# Patient Record
Sex: FEMALE | URBAN - METROPOLITAN AREA
[De-identification: names, ages, dates, MRNs, and addresses within clinical notes are randomized per-mention and may not be internally consistent; named-entity substitution may affect disease eponyms.]

---

## 2024-10-08 ENCOUNTER — LAB OUTSIDE AN ENCOUNTER (OUTPATIENT)
Dept: URBAN - METROPOLITAN AREA CLINIC 113 | Facility: CLINIC | Age: 64
End: 2024-10-08

## 2024-10-08 ENCOUNTER — DASHBOARD ENCOUNTERS (OUTPATIENT)
Age: 64
End: 2024-10-08

## 2024-10-08 ENCOUNTER — OFFICE VISIT (OUTPATIENT)
Dept: URBAN - METROPOLITAN AREA CLINIC 113 | Facility: CLINIC | Age: 64
End: 2024-10-08
Payer: COMMERCIAL

## 2024-10-08 VITALS
RESPIRATION RATE: 16 BRPM | SYSTOLIC BLOOD PRESSURE: 154 MMHG | BODY MASS INDEX: 29.83 KG/M2 | TEMPERATURE: 98.1 F | WEIGHT: 158 LBS | HEIGHT: 61 IN | HEART RATE: 68 BPM | DIASTOLIC BLOOD PRESSURE: 84 MMHG

## 2024-10-08 DIAGNOSIS — K21.9 GERD WITHOUT ESOPHAGITIS: ICD-10-CM

## 2024-10-08 DIAGNOSIS — R11.2 NAUSEA AND VOMITING, UNSPECIFIED VOMITING TYPE: ICD-10-CM

## 2024-10-08 DIAGNOSIS — R14.0 ABDOMINAL BLOATING: ICD-10-CM

## 2024-10-08 DIAGNOSIS — R10.9 ABDOMINAL PRESSURE: ICD-10-CM

## 2024-10-08 DIAGNOSIS — K59.09 CHRONIC CONSTIPATION: ICD-10-CM

## 2024-10-08 PROBLEM — 236069009: Status: ACTIVE | Noted: 2024-10-08

## 2024-10-08 PROBLEM — 116289008: Status: ACTIVE | Noted: 2024-10-08

## 2024-10-08 PROBLEM — 247330004: Status: ACTIVE | Noted: 2024-10-08

## 2024-10-08 PROBLEM — 266435005: Status: ACTIVE | Noted: 2024-10-08

## 2024-10-08 PROBLEM — 16932000: Status: ACTIVE | Noted: 2024-10-08

## 2024-10-08 PROCEDURE — 99204 OFFICE O/P NEW MOD 45 MIN: CPT | Performed by: NURSE PRACTITIONER

## 2024-10-08 RX ORDER — LISINOPRIL 30 MG/1
TABLET ORAL
Qty: 90 TABLET | Status: ACTIVE | COMMUNITY

## 2024-10-08 RX ORDER — SIMVASTATIN 40 MG/1
TABLET, FILM COATED ORAL
Qty: 90 TABLET | Status: ACTIVE | COMMUNITY

## 2024-10-08 RX ORDER — POTASSIUM CHLORIDE 1500 MG/1
TABLET, EXTENDED RELEASE ORAL
Qty: 30 TABLET | Status: ACTIVE | COMMUNITY

## 2024-10-08 RX ORDER — FAMOTIDINE 40 MG/1
1 TABLET TABLET, FILM COATED ORAL TWICE A DAY
Qty: 180 TABLET | Refills: 3 | OUTPATIENT
Start: 2024-10-08

## 2024-10-08 RX ORDER — AMLODIPINE BESYLATE 10 MG/1
TABLET ORAL
Qty: 90 TABLET | Status: ACTIVE | COMMUNITY

## 2024-10-08 RX ORDER — FAMOTIDINE 20 MG/1
TAKE 1 TABLET TABLET, FILM COATED ORAL ONCE A DAY
Status: ACTIVE | COMMUNITY

## 2024-10-08 RX ORDER — DULAGLUTIDE 1.5 MG/.5ML
INJECTION, SOLUTION SUBCUTANEOUS
Qty: 2 MILLILITER | Status: ACTIVE | COMMUNITY

## 2024-10-08 RX ORDER — PANTOPRAZOLE 40 MG/1
TABLET, DELAYED RELEASE ORAL
Qty: 90 TABLET | Status: ON HOLD | COMMUNITY

## 2024-10-08 RX ORDER — INSULIN GLARGINE 100 [IU]/ML
INJECTION, SOLUTION SUBCUTANEOUS
Qty: 18 MILLILITER | Status: ACTIVE | COMMUNITY

## 2024-10-08 RX ORDER — FUROSEMIDE 20 MG/1
1 TABLET TABLET ORAL ONCE A DAY
Qty: 30 TABLET | Status: ACTIVE | COMMUNITY

## 2024-10-08 RX ORDER — METFORMIN HYDROCHLORIDE 500 MG/1
TABLET, FILM COATED ORAL
Qty: 180 TABLET | Status: ACTIVE | COMMUNITY

## 2024-10-08 RX ORDER — GABAPENTIN 100 MG/1
CAPSULE ORAL
Qty: 90 CAPSULE | Status: ACTIVE | COMMUNITY

## 2024-10-08 RX ORDER — DAPAGLIFLOZIN 10 MG/1
TABLET, FILM COATED ORAL
Qty: 90 TABLET | Status: ACTIVE | COMMUNITY

## 2024-10-08 RX ORDER — GLIPIZIDE 5 MG/1
TABLET ORAL
Qty: 180 TABLET | Status: ACTIVE | COMMUNITY

## 2024-10-08 RX ORDER — PROMETHAZINE HYDROCHLORIDE 12.5 MG/1
TABLET ORAL
Qty: 20 TABLET | Status: ACTIVE | COMMUNITY

## 2024-10-08 NOTE — HPI-TODAY'S VISIT:
This is a 63-year-old female with a history of hypertension, diabetes, hyperlipidemia, chronic kidney disease referred from her primary care provider at [facility] for GERD, EGD, and colonoscopy. She reports a 1 year history of chronic abdominal bloating that is worse after meals.  She feels as though there is a "cinderblock" in her abdomen causing pressure.  She reports her abdomen is hard.  She denies actual pain.  Bloating is worse after eating Posta, rice, and bread.  She has a history of frequent nausea and sporadic vomiting and excessive belching that has been present since last year.  She has frequent nausea and daily episodes of vomiting that will last 1 or 2 weeks occurring more than once a month.  She is vomiting food.  She denies hematemesis.  Ondansetron has been ineffective.  She is taking promethazine daily to help with symptoms.  She has a history of acid reflux.  She was taking pantoprazole 40 mg daily and was having daily symptoms.  Pantoprazole was discontinued and she was prescribed famotidine 20 mg daily.  She has been on this medication for 3 weeks and reports breakthrough symptoms twice a week.  She denies dysphagia.  She reports that she cannot eat but has not lost weight.  She has history of chronic constipation reporting a hard stool every 2 to 3 days.  She started stool softeners last week and reports this has been helpful. She reports labs within the last 1 or 2 months and a CT scan at [facility] within the last 1 or 2 months.  She reports her A1c was 6.  She states her glucose levels have been high.  She has been on Trulicity for a year.  She takes low-dose aspirin for history of CVA.  She denies other NSAID use.  She denies a family history of esophageal, gastric, or colon cancer. She is using lactose free milk.  She denies use of other dairy products.  She is not drinking carbonated beverages. She reports a colonoscopy 4 years ago during which polyps were removed.  This was performed at [facility].  She states she is due for her next colonoscopy in 2025.

## 2024-11-05 ENCOUNTER — CLAIMS CREATED FROM THE CLAIM WINDOW (OUTPATIENT)
Dept: URBAN - METROPOLITAN AREA SURGERY CENTER 25 | Facility: SURGERY CENTER | Age: 64
End: 2024-11-05
Payer: COMMERCIAL

## 2024-11-05 ENCOUNTER — CLAIMS CREATED FROM THE CLAIM WINDOW (OUTPATIENT)
Dept: URBAN - METROPOLITAN AREA CLINIC 4 | Facility: CLINIC | Age: 64
End: 2024-11-05
Payer: COMMERCIAL

## 2024-11-05 DIAGNOSIS — K29.60 ADENOPAPILLOMATOSIS GASTRICA: ICD-10-CM

## 2024-11-05 DIAGNOSIS — K29.60 OTHER GASTRITIS WITHOUT BLEEDING: ICD-10-CM

## 2024-11-05 DIAGNOSIS — R11.0 AM NAUSEA: ICD-10-CM

## 2024-11-05 DIAGNOSIS — R12 BURNING REFLUX: ICD-10-CM

## 2024-11-05 DIAGNOSIS — K29.70 GASTRITIS, UNSPECIFIED, WITHOUT BLEEDING: ICD-10-CM

## 2024-11-05 DIAGNOSIS — K44.9 HIATAL HERNIA: ICD-10-CM

## 2024-11-05 DIAGNOSIS — R68.81 EARLY SATIETY: ICD-10-CM

## 2024-11-05 DIAGNOSIS — R11.0 NAUSEA: ICD-10-CM

## 2024-11-05 PROCEDURE — 88342 IMHCHEM/IMCYTCHM 1ST ANTB: CPT | Performed by: PATHOLOGY

## 2024-11-05 PROCEDURE — 88305 TISSUE EXAM BY PATHOLOGIST: CPT | Performed by: PATHOLOGY

## 2024-11-05 PROCEDURE — 43239 EGD BIOPSY SINGLE/MULTIPLE: CPT | Performed by: STUDENT IN AN ORGANIZED HEALTH CARE EDUCATION/TRAINING PROGRAM

## 2024-11-05 PROCEDURE — 00731 ANES UPR GI NDSC PX NOS: CPT | Performed by: NURSE ANESTHETIST, CERTIFIED REGISTERED

## 2024-11-05 RX ORDER — GABAPENTIN 100 MG/1
CAPSULE ORAL
Qty: 90 CAPSULE | Status: ACTIVE | COMMUNITY

## 2024-11-05 RX ORDER — PROMETHAZINE HYDROCHLORIDE 12.5 MG/1
TABLET ORAL
Qty: 20 TABLET | Status: ACTIVE | COMMUNITY

## 2024-11-05 RX ORDER — INSULIN GLARGINE 100 [IU]/ML
INJECTION, SOLUTION SUBCUTANEOUS
Qty: 18 MILLILITER | Status: ACTIVE | COMMUNITY

## 2024-11-05 RX ORDER — POTASSIUM CHLORIDE 1500 MG/1
TABLET, EXTENDED RELEASE ORAL
Qty: 30 TABLET | Status: ACTIVE | COMMUNITY

## 2024-11-05 RX ORDER — FAMOTIDINE 20 MG/1
TAKE 1 TABLET TABLET, FILM COATED ORAL ONCE A DAY
Status: ACTIVE | COMMUNITY

## 2024-11-05 RX ORDER — LISINOPRIL 30 MG/1
TABLET ORAL
Qty: 90 TABLET | Status: ACTIVE | COMMUNITY

## 2024-11-05 RX ORDER — AMLODIPINE BESYLATE 10 MG/1
TABLET ORAL
Qty: 90 TABLET | Status: ACTIVE | COMMUNITY

## 2024-11-05 RX ORDER — GLIPIZIDE 5 MG/1
TABLET ORAL
Qty: 180 TABLET | Status: ACTIVE | COMMUNITY

## 2024-11-05 RX ORDER — PANTOPRAZOLE 40 MG/1
TABLET, DELAYED RELEASE ORAL
Qty: 90 TABLET | Status: ON HOLD | COMMUNITY

## 2024-11-05 RX ORDER — FAMOTIDINE 40 MG/1
1 TABLET TABLET, FILM COATED ORAL TWICE A DAY
Qty: 180 TABLET | Refills: 3 | Status: ACTIVE | COMMUNITY
Start: 2024-10-08

## 2024-11-05 RX ORDER — METFORMIN HYDROCHLORIDE 500 MG/1
TABLET, FILM COATED ORAL
Qty: 180 TABLET | Status: ACTIVE | COMMUNITY

## 2024-11-05 RX ORDER — DULAGLUTIDE 1.5 MG/.5ML
INJECTION, SOLUTION SUBCUTANEOUS
Qty: 2 MILLILITER | Status: ACTIVE | COMMUNITY

## 2024-11-05 RX ORDER — DAPAGLIFLOZIN 10 MG/1
TABLET, FILM COATED ORAL
Qty: 90 TABLET | Status: ACTIVE | COMMUNITY

## 2024-11-05 RX ORDER — SIMVASTATIN 40 MG/1
TABLET, FILM COATED ORAL
Qty: 90 TABLET | Status: ACTIVE | COMMUNITY

## 2024-11-05 RX ORDER — FUROSEMIDE 20 MG/1
1 TABLET TABLET ORAL ONCE A DAY
Qty: 30 TABLET | Status: ACTIVE | COMMUNITY

## 2024-11-20 ENCOUNTER — TELEPHONE ENCOUNTER (OUTPATIENT)
Dept: URBAN - METROPOLITAN AREA CLINIC 113 | Facility: CLINIC | Age: 64
End: 2024-11-20

## 2024-11-26 ENCOUNTER — OFFICE VISIT (OUTPATIENT)
Dept: URBAN - METROPOLITAN AREA CLINIC 113 | Facility: CLINIC | Age: 64
End: 2024-11-26
Payer: COMMERCIAL

## 2024-11-26 VITALS
TEMPERATURE: 98.1 F | HEART RATE: 67 BPM | SYSTOLIC BLOOD PRESSURE: 176 MMHG | RESPIRATION RATE: 20 BRPM | WEIGHT: 160 LBS | BODY MASS INDEX: 30.21 KG/M2 | DIASTOLIC BLOOD PRESSURE: 83 MMHG | HEIGHT: 61 IN

## 2024-11-26 DIAGNOSIS — Z86.0100 HX OF COLONIC POLYPS: ICD-10-CM

## 2024-11-26 DIAGNOSIS — R10.9 ABDOMINAL PRESSURE: ICD-10-CM

## 2024-11-26 DIAGNOSIS — R14.0 ABDOMINAL BLOATING: ICD-10-CM

## 2024-11-26 DIAGNOSIS — R10.13 EPIGASTRIC ABDOMINAL PAIN: ICD-10-CM

## 2024-11-26 DIAGNOSIS — K21.9 GERD WITHOUT ESOPHAGITIS: ICD-10-CM

## 2024-11-26 DIAGNOSIS — R11.2 NAUSEA AND VOMITING, UNSPECIFIED VOMITING TYPE: ICD-10-CM

## 2024-11-26 PROCEDURE — 99213 OFFICE O/P EST LOW 20 MIN: CPT | Performed by: NURSE PRACTITIONER

## 2024-11-26 RX ORDER — AMLODIPINE BESYLATE 10 MG/1
TABLET ORAL
Qty: 90 TABLET | Status: ACTIVE | COMMUNITY

## 2024-11-26 RX ORDER — PANTOPRAZOLE 40 MG/1
TABLET, DELAYED RELEASE ORAL
Qty: 90 TABLET | Status: ACTIVE | COMMUNITY

## 2024-11-26 RX ORDER — FUROSEMIDE 20 MG/1
1 TABLET TABLET ORAL ONCE A DAY
Qty: 30 TABLET | Status: ACTIVE | COMMUNITY

## 2024-11-26 RX ORDER — INSULIN GLARGINE 100 [IU]/ML
INJECTION, SOLUTION SUBCUTANEOUS
Qty: 18 MILLILITER | Status: ACTIVE | COMMUNITY

## 2024-11-26 RX ORDER — GABAPENTIN 100 MG/1
CAPSULE ORAL
Qty: 90 CAPSULE | Status: ACTIVE | COMMUNITY

## 2024-11-26 RX ORDER — GLIPIZIDE 5 MG/1
TABLET ORAL
Qty: 180 TABLET | Status: ACTIVE | COMMUNITY

## 2024-11-26 RX ORDER — SIMVASTATIN 40 MG/1
TABLET, FILM COATED ORAL
Qty: 90 TABLET | Status: ACTIVE | COMMUNITY

## 2024-11-26 RX ORDER — PROMETHAZINE HYDROCHLORIDE 12.5 MG/1
TABLET ORAL
Qty: 20 TABLET | Status: ACTIVE | COMMUNITY

## 2024-11-26 RX ORDER — DULAGLUTIDE 1.5 MG/.5ML
INJECTION, SOLUTION SUBCUTANEOUS
Qty: 2 MILLILITER | Status: ACTIVE | COMMUNITY

## 2024-11-26 RX ORDER — DAPAGLIFLOZIN 10 MG/1
TABLET, FILM COATED ORAL
Qty: 90 TABLET | Status: ACTIVE | COMMUNITY

## 2024-11-26 RX ORDER — FAMOTIDINE 20 MG/1
TAKE 1 TABLET TABLET, FILM COATED ORAL ONCE A DAY
Status: ON HOLD | COMMUNITY

## 2024-11-26 RX ORDER — FAMOTIDINE 40 MG/1
1 TABLET TABLET, FILM COATED ORAL TWICE A DAY
Qty: 180 TABLET | Refills: 3 | Status: ACTIVE | COMMUNITY
Start: 2024-10-08

## 2024-11-26 RX ORDER — LISINOPRIL 30 MG/1
TABLET ORAL
Qty: 90 TABLET | Status: ACTIVE | COMMUNITY

## 2024-11-26 RX ORDER — METFORMIN HYDROCHLORIDE 500 MG/1
1 TABLET WITH A MEAL TABLET, FILM COATED ORAL TWICE A DAY
Qty: 180 TABLET | Status: ACTIVE | COMMUNITY

## 2024-11-26 RX ORDER — POTASSIUM CHLORIDE 1500 MG/1
TABLET, EXTENDED RELEASE ORAL
Qty: 30 TABLET | Status: ACTIVE | COMMUNITY

## 2024-11-26 NOTE — HPI-TODAY'S VISIT:
This is a 63-year-old female with a history of hypertension, diabetes, hyperlipidemia, chronic kidney disease, GERD, abdominal bloating, nausea and vomiting, and chronic constipation presenting for follow-up. She was initially seen 10/8/2024 referred for GERD, EGD, and colonoscopy.  She reported frequent breakthrough acid reflux symptoms on pantoprazole.  This had improved somewhat on famotidine 20 mg daily.  She was to increase famotidine to 40 mg twice a day.  She had chronic nausea and vomiting.  It was discussed this may be a side effect of Trulicity or possible gastroparesis from diabetes.  She was scheduled for an EGD and a gastric emptying study was ordered.  In the interim, she was to begin small, low residue meals.  Was discussed that chronic constipation may be contributing to bloating.  She reported some improvement on daily stool softeners.  If symptoms persisted, she was to add MiraLAX 1 capful daily.  Small intestinal bacterial overgrowth associated with his diabetes was also a consideration.  Antibiotic therapy was to be considered if her symptoms would not improve after improvement of bowel habits.  She had a recent CT scan.  The report was requested.  She reported a history of colon polyps removed during colonoscopy 4 years prior.  She stated she was due surveillance in 2025.  The report was requested. Sucrose breath test 11/10/2024:Normal sucrase activity.  EGD 11/5/2024:Normal esophagus, regular Z-line, gastroesophageal flap valve classified as Hill grade 4, 4 cm hiatal hernia, nonerosive gastritis characterized by erythema and adherent blood status post biopsy, normal examined duodenum.  Pathology: Stomach biopsies demonstrated mild chronic gastritis, nonspecific without evidence of H. pylori or intestinal metaplasia.  Labs 8/27/2024:CBC: WBC 7.1, hemoglobin 12.4, MCV 91, platelet 251.  BMP normal with exception of glucose 279, BUN 35, creatinine 1.36.  LFTs: TB less than 0.2, , ALT 21, AST 19.  Cholesterol panel normal.  Hemoglobin A1c 8.6.  TSH 1.760.  Magnesium 2.2.  CT report and colonoscopy records have not been received.  She continues to experience epigastric pain.  She reports it is constant.  She feels as though there is a "cinderblock" in her abdomen after eating.  She has frequent nausea.  She is vomiting intermittently producing fluid, or, if she has eaten, food.  Nausea medication is providing some relief.  She is having normal bowel movements and denies any other abdominal symptoms.  She has lost no weight since her last visit.  She tried a drug holiday from [employer] for a month.  Her symptoms did not improve.  Trulicity and insulin were increased 3 weeks ago due to uncontrolled blood glucose levels.  She has labs planned with her primary care physician on 12/2/2024.  She has had blood work since her last visit.  She recalls a hemoglobin A1c of 7.  She reports a gastric emptying study at [facility] in the first week of November.  She is unsure regarding results.  She is eating 1 meal per day.  She continues to report episodes of acid reflux on pantoprazole 40 mg in the morning and famotidine 40 mg twice a day.  She is having regular bowel movements.

## 2024-11-28 ENCOUNTER — TELEPHONE ENCOUNTER (OUTPATIENT)
Dept: URBAN - METROPOLITAN AREA CLINIC 113 | Facility: CLINIC | Age: 64
End: 2024-11-28

## 2024-11-28 PROBLEM — 428283002: Status: ACTIVE | Noted: 2024-11-28

## 2024-11-28 PROBLEM — 79922009: Status: ACTIVE | Noted: 2024-11-28

## 2024-12-01 ENCOUNTER — LAB OUTSIDE AN ENCOUNTER (OUTPATIENT)
Dept: URBAN - METROPOLITAN AREA CLINIC 113 | Facility: CLINIC | Age: 64
End: 2024-12-01

## 2024-12-02 ENCOUNTER — TELEPHONE ENCOUNTER (OUTPATIENT)
Dept: URBAN - METROPOLITAN AREA CLINIC 113 | Facility: CLINIC | Age: 64
End: 2024-12-02

## 2024-12-02 RX ORDER — VONOPRAZAN FUMARATE 26.72 MG/1
1 TABLET TABLET ORAL ONCE A DAY
Qty: 90 TABLET | Refills: 1 | OUTPATIENT
Start: 2024-12-01 | End: 2025-05-31

## 2024-12-14 PROBLEM — 733657002: Status: ACTIVE | Noted: 2024-12-14

## 2025-01-15 ENCOUNTER — OFFICE VISIT (OUTPATIENT)
Dept: URBAN - METROPOLITAN AREA SURGERY CENTER 25 | Facility: SURGERY CENTER | Age: 65
End: 2025-01-15
Payer: COMMERCIAL

## 2025-01-15 DIAGNOSIS — R12 HEARTBURN: ICD-10-CM

## 2025-01-15 DIAGNOSIS — K44.9 HIATAL HERNIA: ICD-10-CM

## 2025-01-15 PROCEDURE — 00811 ANES LWR INTST NDSC NOS: CPT | Performed by: NURSE ANESTHETIST, CERTIFIED REGISTERED

## 2025-01-15 PROCEDURE — 43235 EGD DIAGNOSTIC BRUSH WASH: CPT | Performed by: STUDENT IN AN ORGANIZED HEALTH CARE EDUCATION/TRAINING PROGRAM

## 2025-01-15 RX ORDER — INSULIN GLARGINE 100 [IU]/ML
INJECTION, SOLUTION SUBCUTANEOUS
Qty: 18 MILLILITER | Status: ACTIVE | COMMUNITY

## 2025-01-15 RX ORDER — METFORMIN HYDROCHLORIDE 500 MG/1
1 TABLET WITH A MEAL TABLET, FILM COATED ORAL TWICE A DAY
Qty: 180 TABLET | Status: ACTIVE | COMMUNITY

## 2025-01-15 RX ORDER — VONOPRAZAN FUMARATE 26.72 MG/1
1 TABLET TABLET ORAL ONCE A DAY
Qty: 90 TABLET | Refills: 1 | Status: ACTIVE | COMMUNITY
Start: 2024-12-01 | End: 2025-05-31

## 2025-01-15 RX ORDER — AMLODIPINE BESYLATE 10 MG/1
TABLET ORAL
Qty: 90 TABLET | Status: ACTIVE | COMMUNITY

## 2025-01-15 RX ORDER — FUROSEMIDE 20 MG/1
1 TABLET TABLET ORAL ONCE A DAY
Qty: 30 TABLET | Status: ACTIVE | COMMUNITY

## 2025-01-15 RX ORDER — DULAGLUTIDE 1.5 MG/.5ML
INJECTION, SOLUTION SUBCUTANEOUS
Qty: 2 MILLILITER | Status: ACTIVE | COMMUNITY

## 2025-01-15 RX ORDER — FAMOTIDINE 40 MG/1
1 TABLET TABLET, FILM COATED ORAL TWICE A DAY
Qty: 180 TABLET | Refills: 3 | Status: ACTIVE | COMMUNITY
Start: 2024-10-08

## 2025-01-15 RX ORDER — SIMVASTATIN 40 MG/1
TABLET, FILM COATED ORAL
Qty: 90 TABLET | Status: ACTIVE | COMMUNITY

## 2025-01-15 RX ORDER — POTASSIUM CHLORIDE 1500 MG/1
TABLET, EXTENDED RELEASE ORAL
Qty: 30 TABLET | Status: ACTIVE | COMMUNITY

## 2025-01-15 RX ORDER — GLIPIZIDE 5 MG/1
TABLET ORAL
Qty: 180 TABLET | Status: ACTIVE | COMMUNITY

## 2025-01-15 RX ORDER — DAPAGLIFLOZIN 10 MG/1
TABLET, FILM COATED ORAL
Qty: 90 TABLET | Status: ACTIVE | COMMUNITY

## 2025-01-15 RX ORDER — FAMOTIDINE 20 MG/1
TAKE 1 TABLET TABLET, FILM COATED ORAL ONCE A DAY
Status: ON HOLD | COMMUNITY

## 2025-01-15 RX ORDER — LISINOPRIL 30 MG/1
TABLET ORAL
Qty: 90 TABLET | Status: ACTIVE | COMMUNITY

## 2025-01-15 RX ORDER — GABAPENTIN 100 MG/1
CAPSULE ORAL
Qty: 90 CAPSULE | Status: ACTIVE | COMMUNITY

## 2025-01-15 RX ORDER — PROMETHAZINE HYDROCHLORIDE 12.5 MG/1
TABLET ORAL
Qty: 20 TABLET | Status: ACTIVE | COMMUNITY

## 2025-01-15 RX ORDER — PANTOPRAZOLE 40 MG/1
TABLET, DELAYED RELEASE ORAL
Qty: 90 TABLET | Status: ACTIVE | COMMUNITY

## 2025-01-17 ENCOUNTER — OFFICE VISIT (OUTPATIENT)
Dept: URBAN - METROPOLITAN AREA CLINIC 112 | Facility: CLINIC | Age: 65
End: 2025-01-17

## 2025-01-27 ENCOUNTER — TELEPHONE ENCOUNTER (OUTPATIENT)
Dept: URBAN - METROPOLITAN AREA CLINIC 113 | Facility: CLINIC | Age: 65
End: 2025-01-27

## 2025-01-27 ENCOUNTER — OFFICE VISIT (OUTPATIENT)
Dept: URBAN - METROPOLITAN AREA CLINIC 113 | Facility: CLINIC | Age: 65
End: 2025-01-27
Payer: COMMERCIAL

## 2025-01-27 VITALS
BODY MASS INDEX: 29.07 KG/M2 | HEIGHT: 61 IN | WEIGHT: 154 LBS | TEMPERATURE: 97.7 F | RESPIRATION RATE: 20 BRPM | DIASTOLIC BLOOD PRESSURE: 93 MMHG | SYSTOLIC BLOOD PRESSURE: 184 MMHG | HEART RATE: 81 BPM

## 2025-01-27 DIAGNOSIS — K59.09 CHRONIC CONSTIPATION: ICD-10-CM

## 2025-01-27 DIAGNOSIS — K44.9 HIATAL HERNIA: ICD-10-CM

## 2025-01-27 DIAGNOSIS — K21.9 GERD WITHOUT ESOPHAGITIS: ICD-10-CM

## 2025-01-27 PROCEDURE — 99214 OFFICE O/P EST MOD 30 MIN: CPT | Performed by: STUDENT IN AN ORGANIZED HEALTH CARE EDUCATION/TRAINING PROGRAM

## 2025-01-27 RX ORDER — GLIPIZIDE 5 MG/1
TABLET ORAL
Qty: 180 TABLET | Status: ACTIVE | COMMUNITY

## 2025-01-27 RX ORDER — DAPAGLIFLOZIN 10 MG/1
TABLET, FILM COATED ORAL
Qty: 90 TABLET | Status: ACTIVE | COMMUNITY

## 2025-01-27 RX ORDER — PANTOPRAZOLE 40 MG/1
TABLET, DELAYED RELEASE ORAL
Qty: 90 TABLET | Status: ACTIVE | COMMUNITY

## 2025-01-27 RX ORDER — FAMOTIDINE 20 MG/1
TAKE 1 TABLET TABLET, FILM COATED ORAL ONCE A DAY
Status: ON HOLD | COMMUNITY

## 2025-01-27 RX ORDER — INSULIN GLARGINE 100 [IU]/ML
INJECTION, SOLUTION SUBCUTANEOUS
Qty: 18 MILLILITER | Status: ACTIVE | COMMUNITY

## 2025-01-27 RX ORDER — METFORMIN HYDROCHLORIDE 500 MG/1
1 TABLET WITH A MEAL TABLET, FILM COATED ORAL TWICE A DAY
Qty: 180 TABLET | Status: ACTIVE | COMMUNITY

## 2025-01-27 RX ORDER — LINACLOTIDE 290 UG/1
1 CAPSULE AT LEAST 30 MINUTES BEFORE THE FIRST MEAL OF THE DAY ON AN EMPTY STOMACH CAPSULE, GELATIN COATED ORAL ONCE A DAY
Qty: 90 | Refills: 1 | OUTPATIENT
Start: 2025-01-27 | End: 2025-07-26

## 2025-01-27 RX ORDER — GABAPENTIN 100 MG/1
CAPSULE ORAL
Qty: 90 CAPSULE | Status: ACTIVE | COMMUNITY

## 2025-01-27 RX ORDER — LISINOPRIL 30 MG/1
TABLET ORAL
Qty: 90 TABLET | Status: ACTIVE | COMMUNITY

## 2025-01-27 RX ORDER — FUROSEMIDE 20 MG/1
1 TABLET TABLET ORAL ONCE A DAY
Qty: 30 TABLET | Status: ACTIVE | COMMUNITY

## 2025-01-27 RX ORDER — SIMVASTATIN 40 MG/1
TABLET, FILM COATED ORAL
Qty: 90 TABLET | Status: ACTIVE | COMMUNITY

## 2025-01-27 RX ORDER — POTASSIUM CHLORIDE 1500 MG/1
TABLET, EXTENDED RELEASE ORAL
Qty: 30 TABLET | Status: ACTIVE | COMMUNITY

## 2025-01-27 RX ORDER — PROMETHAZINE HYDROCHLORIDE 12.5 MG/1
TABLET ORAL
Qty: 20 TABLET | Status: ACTIVE | COMMUNITY

## 2025-01-27 RX ORDER — DULAGLUTIDE 1.5 MG/.5ML
INJECTION, SOLUTION SUBCUTANEOUS
Qty: 2 MILLILITER | Status: ACTIVE | COMMUNITY

## 2025-01-27 RX ORDER — VONOPRAZAN FUMARATE 26.72 MG/1
1 TABLET TABLET ORAL ONCE A DAY
Qty: 90 TABLET | Refills: 1 | Status: ACTIVE | COMMUNITY
Start: 2024-12-01 | End: 2025-05-31

## 2025-01-27 RX ORDER — AMLODIPINE BESYLATE 10 MG/1
TABLET ORAL
Qty: 90 TABLET | Status: ACTIVE | COMMUNITY

## 2025-01-27 RX ORDER — FAMOTIDINE 40 MG/1
1 TABLET TABLET, FILM COATED ORAL TWICE A DAY
Qty: 180 TABLET | Refills: 3 | Status: ACTIVE | COMMUNITY
Start: 2024-10-08

## 2025-01-27 NOTE — HPI-TODAY'S VISIT:
Last visit 11/26/2024; PCP Dr. Vásquez Seen by Parris Ashton NP This is a 63-year-old female with a history of hypertension, diabetes, hyperlipidemia, chronic kidney disease, GERD, abdominal bloating, nausea and vomiting, and chronic constipation presenting for follow-up. She was initially seen 10/8/2024 referred for GERD, EGD, and colonoscopy.  She reported frequent breakthrough acid reflux symptoms on pantoprazole.  This had improved somewhat on famotidine 20 mg daily.  She was to increase famotidine to 40 mg twice a day.  She had chronic nausea and vomiting.  It was discussed this may be a side effect of Trulicity or possible gastroparesis from diabetes.  She was scheduled for an EGD and a gastric emptying study was ordered.  In the interim, she was to begin small, low residue meals.  Was discussed that chronic constipation may be contributing to bloating.  She reported some improvement on daily stool softeners.  If symptoms persisted, she was to add MiraLAX 1 capful daily.  Small intestinal bacterial overgrowth associated with his diabetes was also a consideration.  Antibiotic therapy was to be considered if her symptoms would not improve after improvement of bowel habits.  She had a recent CT scan.  The report was requested.  She reported a history of colon polyps removed during colonoscopy 4 years prior.  She stated she was due surveillance in 2025.  The report was requested. Sucrose breath test 11/10/2024:Normal sucrase activity. EGD 11/5/2024:Normal esophagus, regular Z-line, gastroesophageal flap valve classified as Hill grade 4, 4 cm hiatal hernia, nonerosive gastritis characterized by erythema and adherent blood status post biopsy, normal examined duodenum.  Pathology: Stomach biopsies demonstrated mild chronic gastritis, nonspecific without evidence of H. pylori or intestinal metaplasia. Labs 8/27/2024:CBC: WBC 7.1, hemoglobin 12.4, MCV 91, platelet 251.  BMP normal with exception of glucose 279, BUN 35, creatinine 1.36.  LFTs: TB less than 0.2, , ALT 21, AST 19.  Cholesterol panel normal.  Hemoglobin A1c 8.6.  TSH 1.760.  Magnesium 2.2. CT report and colonoscopy records have not been received. She continues to experience epigastric pain.  She reports it is constant.  She feels as though there is a "cinderblock" in her abdomen after eating.  She has frequent nausea.  She is vomiting intermittently producing fluid, or, if she has eaten, food.  Nausea medication is providing some relief.  She is having normal bowel movements and denies any other abdominal symptoms.  She has lost no weight since her last visit.  She tried a drug holiday from [employer] for a month.  Her symptoms did not improve.  Trulicity and insulin were increased 3 weeks ago due to uncontrolled blood glucose levels.  She has labs planned with her primary care physician on 12/2/2024.  She has had blood work since her last visit.  She recalls a hemoglobin A1c of 7.  She reports a gastric emptying study at [facility] in the first week of November.  She is unsure regarding results.  She is eating 1 meal per day.  She continues to report episodes of acid reflux on pantoprazole 40 mg in the morning and famotidine 40 mg twice a day.  She is having regular bowel movements. . Follow-up visit 1/27/2025 Gastric emptying study 10/23/2024: Normal gastric emptying study with 94% emptying after 4 hours. Sucrose breath test 11/10/2024: Normal sucrase activity EGD 1/15/2025 performed for heartburn: Esophagus normal, Z-line regular 35 cm, 4 cm hiatal hernia present, gastric and duodenal mucosa appeared normal. BRAVO deployed. BRAVO report 1/15/25: ON acid suppression, there is breakthrough acid reflux that correlates to regurgitation although this did not meet criteria based on DeMeester score.  She continues to complain of post-prandial regurgitation and epigastric discomfort. Phenergan provides some relief but is making her drowsy and this impedes her ability to work.  She is on voquezna 20 mg daily which is helping some. She continues to complain of constipation, she has tried all over the counter options (fiber, magnesium compounds, stool softeners) without appropriate effect.

## 2025-01-27 NOTE — HPI-OTHER HISTORIES
Sucrose breath test 11/10/2024:Normal sucrase activity.  EGD 11/5/2024:Normal esophagus, regular Z-line, gastroesophageal flap valve classified as Hill grade 4, 4 cm hiatal hernia, nonerosive gastritis characterized by erythema and adherent blood status post biopsy, normal examined duodenum. Pathology: Stomach biopsies demonstrated mild chronic gastritis, nonspecific without evidence of H. pylori or intestinal metaplasia.  Labs 8/27/2024:CBC: WBC 7.1, hemoglobin 12.4, MCV 91, platelet 251. BMP normal with exception of glucose 279, BUN 35, creatinine 1.36. LFTs: TB less than 0.2, , ALT 21, AST 19. Cholesterol panel normal. Hemoglobin A1c 8.6. TSH 1.760. Magnesium 2.2.

## 2025-02-14 ENCOUNTER — TELEPHONE ENCOUNTER (OUTPATIENT)
Dept: URBAN - METROPOLITAN AREA CLINIC 113 | Facility: CLINIC | Age: 65
End: 2025-02-14

## 2025-02-18 ENCOUNTER — TELEPHONE ENCOUNTER (OUTPATIENT)
Dept: URBAN - METROPOLITAN AREA CLINIC 113 | Facility: CLINIC | Age: 65
End: 2025-02-18

## 2025-04-29 ENCOUNTER — OFFICE VISIT (OUTPATIENT)
Dept: URBAN - METROPOLITAN AREA CLINIC 113 | Facility: CLINIC | Age: 65
End: 2025-04-29

## 2025-08-19 ENCOUNTER — TELEPHONE ENCOUNTER (OUTPATIENT)
Dept: URBAN - METROPOLITAN AREA CLINIC 113 | Facility: CLINIC | Age: 65
End: 2025-08-19

## 2025-08-19 ENCOUNTER — ERX REFILL RESPONSE (OUTPATIENT)
Dept: URBAN - METROPOLITAN AREA CLINIC 113 | Facility: CLINIC | Age: 65
End: 2025-08-19

## 2025-08-19 RX ORDER — LINACLOTIDE 290 UG/1
TAKE 1 CAPSULE BY MOUTH DAILY ON AN EMPTY STOMACH CAPSULE, GELATIN COATED ORAL
Qty: 90 CAPSULE | Refills: 0 | OUTPATIENT

## 2025-08-19 RX ORDER — LINACLOTIDE 290 UG/1
TAKE 1 CAPSULE BY MOUTH DAILY ON AN EMPTY STOMACH CAPSULE, GELATIN COATED ORAL
Qty: 90 | Refills: 1

## 2025-08-22 ENCOUNTER — TELEPHONE ENCOUNTER (OUTPATIENT)
Dept: URBAN - METROPOLITAN AREA CLINIC 113 | Facility: CLINIC | Age: 65
End: 2025-08-22